# Patient Record
Sex: FEMALE | Race: WHITE
[De-identification: names, ages, dates, MRNs, and addresses within clinical notes are randomized per-mention and may not be internally consistent; named-entity substitution may affect disease eponyms.]

---

## 2018-10-16 ENCOUNTER — HOSPITAL ENCOUNTER (INPATIENT)
Dept: HOSPITAL 96 - M.ERS | Age: 23
LOS: 1 days | Discharge: HOME | DRG: 389 | End: 2018-10-17
Attending: INTERNAL MEDICINE | Admitting: INTERNAL MEDICINE
Payer: COMMERCIAL

## 2018-10-16 VITALS — SYSTOLIC BLOOD PRESSURE: 111 MMHG | DIASTOLIC BLOOD PRESSURE: 72 MMHG

## 2018-10-16 VITALS — SYSTOLIC BLOOD PRESSURE: 110 MMHG | DIASTOLIC BLOOD PRESSURE: 44 MMHG

## 2018-10-16 VITALS — DIASTOLIC BLOOD PRESSURE: 65 MMHG | SYSTOLIC BLOOD PRESSURE: 100 MMHG

## 2018-10-16 VITALS — WEIGHT: 127 LBS | BODY MASS INDEX: 21.68 KG/M2 | HEIGHT: 64.02 IN

## 2018-10-16 VITALS — DIASTOLIC BLOOD PRESSURE: 61 MMHG | SYSTOLIC BLOOD PRESSURE: 107 MMHG

## 2018-10-16 VITALS — SYSTOLIC BLOOD PRESSURE: 116 MMHG | DIASTOLIC BLOOD PRESSURE: 60 MMHG

## 2018-10-16 DIAGNOSIS — K56.609: Primary | ICD-10-CM

## 2018-10-16 DIAGNOSIS — N39.0: ICD-10-CM

## 2018-10-16 DIAGNOSIS — F17.210: ICD-10-CM

## 2018-10-16 DIAGNOSIS — K31.9: ICD-10-CM

## 2018-10-16 DIAGNOSIS — Z79.899: ICD-10-CM

## 2018-10-16 DIAGNOSIS — Z79.1: ICD-10-CM

## 2018-10-16 DIAGNOSIS — E87.6: ICD-10-CM

## 2018-10-16 LAB
ABSOLUTE BASOPHILS: 0.1 THOU/UL (ref 0–0.2)
ABSOLUTE EOSINOPHILS: 0.3 THOU/UL (ref 0–0.7)
ABSOLUTE MONOCYTES: 0.9 THOU/UL (ref 0–1.2)
ALBUMIN SERPL-MCNC: 3.7 G/DL (ref 3.4–5)
ALP SERPL-CCNC: 74 U/L (ref 46–116)
ALT SERPL-CCNC: 16 U/L (ref 30–65)
ANION GAP SERPL CALC-SCNC: 12 MMOL/L (ref 7–16)
AST SERPL-CCNC: 14 U/L (ref 15–37)
BASOPHILS NFR BLD AUTO: 0.4 %
BILIRUB SERPL-MCNC: 0.3 MG/DL
BILIRUB UR-MCNC: NEGATIVE MG/DL
BUN SERPL-MCNC: 10 MG/DL (ref 7–18)
CALCIUM SERPL-MCNC: 8.8 MG/DL (ref 8.5–10.1)
CHLORIDE SERPL-SCNC: 102 MMOL/L (ref 98–107)
CO2 SERPL-SCNC: 23 MMOL/L (ref 21–32)
COLOR UR: YELLOW
CREAT SERPL-MCNC: 0.9 MG/DL (ref 0.6–1.3)
EOSINOPHIL NFR BLD: 2 %
GLUCOSE SERPL-MCNC: 173 MG/DL (ref 70–99)
GRANULOCYTES NFR BLD MANUAL: 67.2 %
HCT VFR BLD CALC: 35.6 % (ref 37–47)
HCT VFR BLD CALC: 40.6 % (ref 37–47)
HGB BLD-MCNC: 11.9 GM/DL (ref 12–15)
HGB BLD-MCNC: 13.6 GM/DL (ref 12–15)
KETONES UR STRIP-MCNC: (no result) MG/DL
LIPASE: 170 U/L (ref 73–393)
LYMPHOCYTES # BLD: 4.1 THOU/UL (ref 0.8–5.3)
LYMPHOCYTES NFR BLD AUTO: 25 %
MCH RBC QN AUTO: 32.3 PG (ref 26–34)
MCH RBC QN AUTO: 32.9 PG (ref 26–34)
MCHC RBC AUTO-ENTMCNC: 33.4 G/DL (ref 28–37)
MCHC RBC AUTO-ENTMCNC: 33.6 G/DL (ref 28–37)
MCV RBC: 96.7 FL (ref 80–100)
MCV RBC: 97.8 FL (ref 80–100)
MONOCYTES NFR BLD: 5.4 %
MPV: 8.6 FL. (ref 7.2–11.1)
MPV: 8.6 FL. (ref 7.2–11.1)
MUCUS: (no result) STRN/LPF
NEUTROPHILS # BLD: 11 THOU/UL (ref 1.6–8.1)
NUCLEATED RBCS: 0 /100WBC
PLATELET COUNT*: 212 THOU/UL (ref 150–400)
PLATELET COUNT*: 286 THOU/UL (ref 150–400)
POTASSIUM SERPL-SCNC: 3 MMOL/L (ref 3.5–5.1)
PROT SERPL-MCNC: 7.1 G/DL (ref 6.4–8.2)
PROT UR QL STRIP: NEGATIVE
RBC # BLD AUTO: 3.64 MIL/UL (ref 4.2–5)
RBC # BLD AUTO: 4.2 MIL/UL (ref 4.2–5)
RBC # UR STRIP: NEGATIVE /UL
RBC #/AREA URNS HPF: (no result) /HPF (ref 0–2)
RDW-CV: 12.6 % (ref 10.5–14.5)
RDW-CV: 13 % (ref 10.5–14.5)
SODIUM SERPL-SCNC: 137 MMOL/L (ref 136–145)
SP GR UR STRIP: >= 1.03 (ref 1–1.03)
SQUAMOUS: (no result) /LPF (ref 0–3)
URINE CLARITY: CLEAR
URINE GLUCOSE-RANDOM: NEGATIVE
URINE LEUKOCYTES-REFLEX: (no result)
URINE NITRITE-REFLEX: NEGATIVE
UROBILINOGEN UR STRIP-ACNC: 0.2 E.U./DL (ref 0.2–1)
WBC # BLD AUTO: 16.4 THOU/UL (ref 4–11)
WBC # BLD AUTO: 8.9 THOU/UL (ref 4–11)

## 2018-10-16 NOTE — PROC
[address]  [address]                    PROCEDURE REPORT              
_______________________________________________________________________________
 
Name:       PABLO COPPOLA             Room:           [address] [place]  
M.R.#:  [id number]      Account #:      [id number]  
Admission:  10/16/18     Attend Phys:    Brad Baez MD 
Discharge:  10/17/18     Date of Birth:  [birth year]  
         Report #: [id number]
                                                                                
_______________________________________________________________________________
THIS REPORT FOR:  //name//                      
 
For GI report, please see the Provation report in [facility] content.
 
 
 
 
 
 
 
 
 
 
 
 
 
 
 
 
 
 
 
 
 
 
 
 
 
 
 
 
 
 
 
 
 
 
 
 
 
 
 
 
 
                       
                                        By:                                
                 
D: 10/17/18     _______________________________________
T: 10/18/18 1257Medical Records Staff RONY       /AL

## 2018-10-17 VITALS — SYSTOLIC BLOOD PRESSURE: 102 MMHG | DIASTOLIC BLOOD PRESSURE: 58 MMHG

## 2018-10-17 VITALS — SYSTOLIC BLOOD PRESSURE: 100 MMHG | DIASTOLIC BLOOD PRESSURE: 65 MMHG

## 2018-10-17 VITALS — DIASTOLIC BLOOD PRESSURE: 61 MMHG | SYSTOLIC BLOOD PRESSURE: 106 MMHG

## 2018-10-17 VITALS — SYSTOLIC BLOOD PRESSURE: 106 MMHG | DIASTOLIC BLOOD PRESSURE: 61 MMHG

## 2018-10-17 LAB
ABSOLUTE BASOPHILS: 0 THOU/UL (ref 0–0.2)
ABSOLUTE EOSINOPHILS: 0.2 THOU/UL (ref 0–0.7)
ABSOLUTE MONOCYTES: 0.6 THOU/UL (ref 0–1.2)
ANION GAP SERPL CALC-SCNC: 6 MMOL/L (ref 7–16)
BASOPHILS NFR BLD AUTO: 0.4 %
BUN SERPL-MCNC: 5 MG/DL (ref 7–18)
CALCIUM SERPL-MCNC: 9 MG/DL (ref 8.5–10.1)
CHLORIDE SERPL-SCNC: 109 MMOL/L (ref 98–107)
CO2 SERPL-SCNC: 27 MMOL/L (ref 21–32)
CREAT SERPL-MCNC: 0.7 MG/DL (ref 0.6–1.3)
EOSINOPHIL NFR BLD: 2.2 %
GLUCOSE SERPL-MCNC: 84 MG/DL (ref 70–99)
GRANULOCYTES NFR BLD MANUAL: 63.6 %
HCT VFR BLD CALC: 36.9 % (ref 37–47)
HGB BLD-MCNC: 12.2 GM/DL (ref 12–15)
LYMPHOCYTES # BLD: 2.3 THOU/UL (ref 0.8–5.3)
LYMPHOCYTES NFR BLD AUTO: 26.7 %
MAGNESIUM SERPL-MCNC: 1.9 MG/DL (ref 1.8–2.4)
MCH RBC QN AUTO: 32.6 PG (ref 26–34)
MCHC RBC AUTO-ENTMCNC: 33 G/DL (ref 28–37)
MCV RBC: 98.7 FL (ref 80–100)
MONOCYTES NFR BLD: 7.1 %
MPV: 8.8 FL. (ref 7.2–11.1)
NEUTROPHILS # BLD: 5.5 THOU/UL (ref 1.6–8.1)
NUCLEATED RBCS: 0 /100WBC
PLATELET COUNT*: 231 THOU/UL (ref 150–400)
POTASSIUM SERPL-SCNC: 5.2 MMOL/L (ref 3.5–5.1)
RBC # BLD AUTO: 3.73 MIL/UL (ref 4.2–5)
RDW-CV: 13 % (ref 10.5–14.5)
SODIUM SERPL-SCNC: 142 MMOL/L (ref 136–145)
WBC # BLD AUTO: 8.7 THOU/UL (ref 4–11)

## 2018-10-17 PROCEDURE — 0DB68ZX EXCISION OF STOMACH, VIA NATURAL OR ARTIFICIAL OPENING ENDOSCOPIC, DIAGNOSTIC: ICD-10-PCS | Performed by: INTERNAL MEDICINE

## 2018-11-07 NOTE — CON
[address]  [address]                    CONSULTATION                  
_______________________________________________________________________________
 
Name:       ABDONPABLO KELLY             Room:           [address] [place]  
M.R.#:  [id number]      Account #:      [id number]  
Admission:  10/16/18     Attend Phys:    Brad Baez MD 
Discharge:  10/17/18     Date of Birth:  [birth year]  
         Report #: [id number]
                                                                     [id number]  
_______________________________________________________________________________
THIS REPORT FOR:  //name//                      
 
CC: Brad Espana
 
DATE OF SERVICE:  10/16/2018
 
 
HISTORY OF PRESENT ILLNESS:  This is a pleasant 23-year-old female with no
significant past medical history who is presenting to the hospital after one day
of sharp abdominal pain.  The patient reports the pain started while she was
watching TV at night and was sudden in onset.  The patient reports pain is
sharp, cramping in nature and located in the suprapubic region.  The patient
initially thought this was some sort of abdominal cramp and was going to resolve
by itself, but the pain did not, had progressively worsened.  The patient also
reports subjective fever, which lasted for 1 day along with some chills.  The
patient denies any nausea, vomiting, diarrhea, hematemesis or hematochezia.  The
patient reports taking NSAIDs on a fairly regular basis at least a few days a
week.
 
PAST MEDICAL HISTORY:  No significant past medical history.
 
PAST SURGICAL HISTORY:  No significant past surgical history.
 
FAMILY HISTORY:  Negative for inflammatory bowel disease or colon cancer.
 
SOCIAL HISTORY:  The patient denies smoking, alcohol or recreational drug use.
 
REVIEW OF SYSTEMS:  A comprehensive 10-point review of systems is negative
except for what was mentioned in the HPI.
 
PHYSICAL EXAMINATION:
VITAL SIGNS:  Temperature 36.8, pulse rate 75, respirations 12, blood pressure
110/44, pulse ox 97%.
GENERAL:  The patient is alert, awake, oriented x 3.
HEENT:  Pupils are equal, round, reactive to light and accommodation.  Mucous
membranes are moist.  There is no congestion.
LUNGS:  Clear to auscultation bilaterally.
CARDIOVASCULAR:  Rate and rhythm regular, S1, S2 present.
ABDOMEN:  Soft.  There is no tenderness, no guarding, no rigidity.
EXTREMITIES:  Warm and well perfused.
SKIN:  Warm and dry.  There is no focal neurological deficit.
 
LABORATORY DATA:  Hemoglobin 10.6, hematocrit 35.6, platelet count 212, WBC
count 8.9.  Sodium 137, potassium ____ chloride 102, bicarbonate 23, BUN 10,
creatinine ____ AST 14, ALT 16, alkaline phosphatase 74, lipase 117.  Urinalysis
 
 
 
[place] [address]                    CONSULTATION                  
_______________________________________________________________________________
 
Name:       PABLO COPPOLA             Room:           [facility]    [facility] [place]  
M.R.#:  [id number]      Account #:      [id number]  
Admission:  10/16/18     Attend Phys:    Brad Baez MD 
Discharge:  10/17/18     Date of Birth:  [birth year]  
         Report #: [id number]
                                                                     [id number]  
_______________________________________________________________________________
positive for ketones, wbc and leukocyte esterase.  Urine hCG negative.
 
IMAGING DATA:  Abdominal and pelvis CT demonstrates dilation of the proximal
duodenum, component of obstruction not excluded.
 
ASSESSMENT AND PLAN:  This is a pleasant 23-year-old female with no significant
past medical history who is presenting with acute onset of suprapubic abdominal
pain.  The patient was found to have a urinary tract infection and additionally
CT demonstrates dilation of the duodenal bulb.  The patient has a significant
history of nonsteroidal antiinflammatory drug intake and it is possible ____
small bowel.  I would perform ____ make the patient n.p.o. after midnight.
 
 
 
 
 
 
 
 
 
 
 
 
 
 
 
 
 
 
 
 
 
 
 
 
 
 
 
 
 
 
 
 
 
<ELECTRONICALLY SIGNED>
                                        By:  Domingo Mullins MD         
11/07/18     1252
D: 10/16/18 2135_______________________________________
T: 10/17/18 1531Domingo Mullins MD            /nt

## 2018-11-09 NOTE — PATH
[address]  [address]                    PATHOLOGY RPT PROCEDURE       
_______________________________________________________________________________
 
Name:       PABLO MCGRAW             Room:           [address] [place]  
M.R.#:  [id number]      Account #:      [id number]  
Admission:  10/16/18     Date of Birth:  [birth year]  
Discharge:  10/17/18                   Report #:    [id number]
                                                         Path Case #: [id number]
_______________________________________________________________________________
 
[facility] Accession Number: [id number]
.                                                                01
Material submitted:                                        .
GASTRIC BIOPSY R/O H-PYLORI
.                                                                01
Clinical history:                                          .
None provided
.                                                                02
**********************************************************************
Diagnosis:
Gastric, biopsy:
- Chronic inactive gastritis.
- An H. pylori immunostain is negative (block A1; appropriate control).
(MAP:pit 10/18/2018)
QTP/10/18/2018
**********************************************************************
.                                                                02
Electronically signed:                                     .
Kedar Horner MD, Pathologist
NPI- [id number]
.                                                                01
Gross description:                                         .
Received in formalin labeled "Pablo Mcgraw, gastric biopsy, rule out H.
pylori," are 3 segments of tan soft tissue measuring 1.3 x 0.9 x 0.2 cm in
aggregate dimensions and ranging from 0.3 to 0.8 cm in maximum dimension.
The specimen is submitted entirely in cassette A1.
(TSD; 10/17/2018)
TOB/TOB
.                                                                02
Pathologist provided ICD-10:
K29.50
.                                                                02
CPT                                                        .
515030, H88363
Specimen Comment: Report sent to ,DR LYN / DR CELESTIN
***Performed at:  01
   [facility], [place], [place]  [id number]
   MD Zac Lewis MD Phone:  [phone]
***Performed at:  02
   [facility] [address] [facility] [place], [place]  [id number]
   MD Sebastian Lopez MD Phone:  [phone]